# Patient Record
Sex: MALE | Race: BLACK OR AFRICAN AMERICAN | URBAN - METROPOLITAN AREA
[De-identification: names, ages, dates, MRNs, and addresses within clinical notes are randomized per-mention and may not be internally consistent; named-entity substitution may affect disease eponyms.]

---

## 2020-09-24 ENCOUNTER — HISTORICAL (OUTPATIENT)
Dept: ADMINISTRATIVE | Facility: HOSPITAL | Age: 50
End: 2020-09-24

## 2020-09-24 LAB
APPEARANCE, UA: CLEAR
BACTERIA SPEC CULT: NORMAL /HPF
BILIRUB SERPL-MCNC: NEGATIVE MG/DL
BILIRUB UR QL STRIP: NEGATIVE
BLOOD URINE, POC: NEGATIVE
CLARITY, POC UA: CLEAR
COLOR UR: YELLOW
COLOR, POC UA: YELLOW
GLUCOSE (UA): NEGATIVE
GLUCOSE UR QL STRIP: NEGATIVE
HGB UR QL STRIP: NEGATIVE
KETONES UR QL STRIP: NEGATIVE
KETONES UR QL STRIP: NEGATIVE
LEUKOCYTE EST, POC UA: NEGATIVE
LEUKOCYTE ESTERASE UR QL STRIP: NEGATIVE
NITRITE UR QL STRIP: NEGATIVE
NITRITE, POC UA: NEGATIVE
PH UR STRIP: 6 [PH] (ref 5–9)
PH, POC UA: 6
PROT UR QL STRIP: NEGATIVE
PROTEIN, POC: NEGATIVE
RBC #/AREA URNS HPF: NORMAL /[HPF]
SP GR UR STRIP: 1.01 (ref 1–1.03)
SPECIFIC GRAVITY, POC UA: 1.01
SQUAMOUS EPITHELIAL, UA: NORMAL
UROBILINOGEN UR STRIP-ACNC: 1
UROBILINOGEN, POC UA: NORMAL
WBC #/AREA URNS HPF: NORMAL /HPF

## 2020-10-02 ENCOUNTER — HISTORICAL (OUTPATIENT)
Dept: RADIOLOGY | Facility: HOSPITAL | Age: 50
End: 2020-10-02

## 2020-11-02 ENCOUNTER — HISTORICAL (OUTPATIENT)
Dept: PREADMISSION TESTING | Facility: HOSPITAL | Age: 50
End: 2020-11-02

## 2020-11-02 LAB
ABS NEUT (OLG): 4.66 X10(3)/MCL (ref 2.1–9.2)
BASOPHILS # BLD AUTO: 0 X10(3)/MCL (ref 0–0.2)
BASOPHILS NFR BLD AUTO: 0 %
BUN SERPL-MCNC: 11.3 MG/DL (ref 8.9–20.6)
CALCIUM SERPL-MCNC: 9.6 MG/DL (ref 8.4–10.2)
CHLORIDE SERPL-SCNC: 103 MMOL/L (ref 98–107)
CO2 SERPL-SCNC: 30 MMOL/L (ref 22–29)
CREAT SERPL-MCNC: 0.86 MG/DL (ref 0.73–1.18)
CREAT/UREA NIT SERPL: 13
EOSINOPHIL # BLD AUTO: 0 X10(3)/MCL (ref 0–0.9)
EOSINOPHIL NFR BLD AUTO: 1 %
ERYTHROCYTE [DISTWIDTH] IN BLOOD BY AUTOMATED COUNT: 15.4 % (ref 11.5–17)
GLUCOSE SERPL-MCNC: 102 MG/DL (ref 74–100)
HCT VFR BLD AUTO: 49.7 % (ref 42–52)
HGB BLD-MCNC: 15.2 GM/DL (ref 14–18)
LYMPHOCYTES # BLD AUTO: 2.8 X10(3)/MCL (ref 0.6–4.6)
LYMPHOCYTES NFR BLD AUTO: 34 %
MCH RBC QN AUTO: 27.5 PG (ref 27–31)
MCHC RBC AUTO-ENTMCNC: 30.6 GM/DL (ref 33–36)
MCV RBC AUTO: 90 FL (ref 80–94)
MONOCYTES # BLD AUTO: 0.6 X10(3)/MCL (ref 0.1–1.3)
MONOCYTES NFR BLD AUTO: 8 %
NEUTROPHILS # BLD AUTO: 4.66 X10(3)/MCL (ref 2.1–9.2)
NEUTROPHILS NFR BLD AUTO: 57 %
PLATELET # BLD AUTO: 240 X10(3)/MCL (ref 130–400)
PMV BLD AUTO: 11.8 FL (ref 9.4–12.4)
POTASSIUM SERPL-SCNC: 5.3 MMOL/L (ref 3.5–5.1)
RBC # BLD AUTO: 5.52 X10(6)/MCL (ref 4.7–6.1)
SODIUM SERPL-SCNC: 142 MMOL/L (ref 136–145)
WBC # SPEC AUTO: 8.2 X10(3)/MCL (ref 4.5–11.5)

## 2020-11-05 ENCOUNTER — HISTORICAL (OUTPATIENT)
Dept: ADMINISTRATIVE | Facility: HOSPITAL | Age: 50
End: 2020-11-05

## 2021-01-10 ENCOUNTER — HISTORICAL (OUTPATIENT)
Dept: ADMINISTRATIVE | Facility: HOSPITAL | Age: 51
End: 2021-01-10

## 2021-12-29 ENCOUNTER — HISTORICAL (OUTPATIENT)
Dept: ADMINISTRATIVE | Facility: HOSPITAL | Age: 51
End: 2021-12-29

## 2021-12-29 LAB — SARS-COV-2 RNA RESP QL NAA+PROBE: NEGATIVE

## 2022-04-10 ENCOUNTER — HISTORICAL (OUTPATIENT)
Dept: ADMINISTRATIVE | Facility: HOSPITAL | Age: 52
End: 2022-04-10

## 2022-04-26 VITALS
OXYGEN SATURATION: 92 % | DIASTOLIC BLOOD PRESSURE: 87 MMHG | SYSTOLIC BLOOD PRESSURE: 124 MMHG | HEIGHT: 66 IN | WEIGHT: 141.13 LBS | BODY MASS INDEX: 22.68 KG/M2

## 2022-04-30 NOTE — OP NOTE
Patient:   Richar Menendez             MRN: [id number]            FIN: [id number]               Age:   50 years     Sex:  Male     :  1970   Associated Diagnoses:   None   Author:   Emmanuel Euceda MD        DATE OF SURGERY:    20    SURGEON:  Emmanuel Euceda MD    PREOPERATIVE DIAGNOSIS:  Bilateral inguinal hernia.    POST OPERATIVE DIAGNOSIS:  Same.    PROCEDURE:  Laparoscopic Bilateral inguinal  hernia repair with mesh.    ANESTHESIA:  General endotracheal anesthesia.    ESTIMATED BLOOD LOSS:  Less than 20 cc.   Blood administered,  none.    LAP AND INSTRUMENT COUNTS:  Correct X2 at the end of the case.    INDICATIONS AND SIGNIFICANT HISTORY:  Patient is a 50-year-old male with a long history of heavy lifting that presented with complaint of a bilateral inguinal hernia.  Risks and benefits of surgery were discussed with the patient.  Patient voiced understanding of risks and benefit and elected to proceed with surgery.    PROCEDURE IN DETAIL:  Once informed consents were obtained, the patient was taken to the operating room and place supine on the operating table.  General endotracheal anesthesia was induced.  The abdomen was prepped and draped in a standard sterile surgical fashion.  Periumbilical incision was made with the scalpel upon which the dissection was carried down to the level of the anterior rectus fascia which was opened sharply.  The rectus muscles then retracted laterally until the posterior rectus sheath was visualized.  The Lorenzana tipped trocar port was then placed and preperitoneal space was insufflated with 10 millimeters of pressure and blunt dissection down to the pubic tubercle using the 10mm straight scope was performed.  Two additional 5 millimeter trocar ports were placed suprapubic in the midline.  Attention was then directed to the right pubic area.  Dissection was carried out in a medial to lateral fashion.  This started at the pubic tubercle until the lacunar  ligament was visualized.  Then dissection carried out over the inguinal cord structures until lateral abdominal wall was adequately visualized.  The cord structures were then dissected appropriately with the peritoneal reflection was dissected in it's entirety off the cord structures and off the vas deferens.  The patient had both a direct and indirect inguinal hernia.  A Parietex anatomical right mesh was placed in the appropriate fashion to cover the entire myopectineal orifice of [place].  This was then secured above the pubic bone with a tack applier in the midline superiorly and laterally above the inguinal ligament.  Attention was then directed to the left groin and the dissection carried out in a similar was as the right.  Once again he had a direct and indirect inguinal hernia.  A Parietex anatomical Left mesh was placed in similar fashion as the right.   The insufflation was then removed under direct visualization.  All ports were then removed with no active bleeding noted.  The anterior rectus sheath was closed with 0 Vicryl suture in a figure of eight fashion.  The skin was then closed with 4-0 Vicryl suture interrupted fashion.  The skin was clean, dry and sterile dressing was placed on the wound.  Lap and instrument counts were correct X2 at the end of the case.  The patient tolerated the procedure well and was transported to recovery room in good condition.
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
## 2022-09-15 ENCOUNTER — HISTORICAL (OUTPATIENT)
Dept: ADMINISTRATIVE | Facility: HOSPITAL | Age: 52
End: 2022-09-15